# Patient Record
Sex: FEMALE | Race: WHITE | NOT HISPANIC OR LATINO | Employment: FULL TIME | URBAN - METROPOLITAN AREA
[De-identification: names, ages, dates, MRNs, and addresses within clinical notes are randomized per-mention and may not be internally consistent; named-entity substitution may affect disease eponyms.]

---

## 2018-06-07 ENCOUNTER — HOSPITAL ENCOUNTER (OUTPATIENT)
Dept: LAB | Facility: HOSPITAL | Age: 53
Discharge: HOME | End: 2018-06-07
Attending: FAMILY MEDICINE | Admitting: FAMILY MEDICINE

## 2018-06-07 LAB
ALBUMIN SERPL-MCNC: 3.7 G/DL (ref 3.5–4.8)
ALBUMIN/GLOB SERPL: 0.9 {RATIO} (ref 1–1.7)
ALP SERPL-CCNC: 115 IU/L (ref 32–91)
ALT SERPL-CCNC: 13 IU/L (ref 14–54)
ANION GAP SERPL CALC-SCNC: 11 MMOL/L (ref 10–20)
AST SERPL-CCNC: 16 IU/L (ref 15–41)
BASOPHILS # BLD AUTO: 0.1 10*3/UL (ref 0–0.2)
BASOPHILS NFR BLD AUTO: 1 % (ref 0–2)
BILIRUB SERPL-MCNC: 0.9 MG/DL (ref 0.3–1.2)
BUN SERPL-MCNC: 11 MG/DL (ref 8–20)
BUN/CREAT SERPL: 15.7 (ref 5.4–26.2)
CALCIUM SERPL-MCNC: 9.3 MG/DL (ref 8.9–10.3)
CHLORIDE SERPL-SCNC: 105 MMOL/L (ref 101–111)
CHOLEST SERPL-MCNC: 148 MG/DL
CHOLEST/HDLC SERPL: 2.7 {RATIO}
CONV CO2: 28 MMOL/L (ref 22–32)
CONV LDL CHOLESTEROL DIRECT: 83 MG/DL (ref 0–100)
CONV TOTAL PROTEIN: 7.6 G/DL (ref 6.1–7.9)
CREAT UR-MCNC: 0.7 MG/DL (ref 0.4–1)
DIFFERENTIAL METHOD BLD: (no result)
EOSINOPHIL # BLD AUTO: 0.1 10*3/UL (ref 0–0.3)
EOSINOPHIL # BLD AUTO: 2 % (ref 0–3)
ERYTHROCYTE [DISTWIDTH] IN BLOOD BY AUTOMATED COUNT: 14.2 % (ref 11.5–14.5)
GLOBULIN UR ELPH-MCNC: 3.9 G/DL (ref 2.5–3.8)
GLUCOSE SERPL-MCNC: 101 MG/DL (ref 65–99)
HCT VFR BLD AUTO: 41.5 % (ref 35–49)
HDLC SERPL-MCNC: 55 MG/DL
HGB BLD-MCNC: 13.9 G/DL (ref 12–15)
LDLC/HDLC SERPL: 1.5 {RATIO}
LIPID INTERPRETATION: NORMAL
LYMPHOCYTES # BLD AUTO: 1.2 10*3/UL (ref 0.8–4.8)
LYMPHOCYTES NFR BLD AUTO: 21 % (ref 18–42)
MCH RBC QN AUTO: 29.2 PG (ref 26–32)
MCHC RBC AUTO-ENTMCNC: 33.4 G/DL (ref 32–36)
MCV RBC AUTO: 87.4 FL (ref 80–94)
MONOCYTES # BLD AUTO: 0.4 10*3/UL (ref 0.1–1.3)
MONOCYTES NFR BLD AUTO: 6 % (ref 2–11)
NEUTROPHILS # BLD AUTO: 4.2 10*3/UL (ref 2.3–8.6)
NEUTROPHILS NFR BLD AUTO: 70 % (ref 50–75)
NRBC BLD AUTO-RTO: 0 /100{WBCS}
NRBC/RBC NFR BLD MANUAL: 0 10*3/UL
PLATELET # BLD AUTO: 259 10*3/UL (ref 150–450)
PMV BLD AUTO: 9.3 FL (ref 7.4–10.4)
POTASSIUM SERPL-SCNC: 5 MMOL/L (ref 3.6–5.1)
RBC # BLD AUTO: 4.75 10*6/UL (ref 4–5.4)
SODIUM SERPL-SCNC: 139 MMOL/L (ref 136–144)
TRIGL SERPL-MCNC: 44 MG/DL
VLDLC SERPL CALC-MCNC: 10.1 MG/DL
WBC # BLD AUTO: 5.9 10*3/UL (ref 4.5–11.5)

## 2024-02-07 ENCOUNTER — OFFICE VISIT (OUTPATIENT)
Dept: PODIATRY | Facility: CLINIC | Age: 59
End: 2024-02-07
Payer: COMMERCIAL

## 2024-02-07 VITALS — HEIGHT: 64 IN | WEIGHT: 256 LBS | BODY MASS INDEX: 43.71 KG/M2 | RESPIRATION RATE: 20 BRPM

## 2024-02-07 DIAGNOSIS — M21.622 TAILOR'S BUNION OF LEFT FOOT: ICD-10-CM

## 2024-02-07 DIAGNOSIS — M79.671 BILATERAL FOOT PAIN: Primary | ICD-10-CM

## 2024-02-07 DIAGNOSIS — M79.672 BILATERAL FOOT PAIN: Primary | ICD-10-CM

## 2024-02-07 DIAGNOSIS — M19.071 ARTHRITIS OF MIDTARSAL JOINTS OF BOTH FEET: ICD-10-CM

## 2024-02-07 DIAGNOSIS — I87.323 CHRONIC VENOUS HYPERTENSION (IDIOPATHIC) WITH INFLAMMATION OF BILATERAL LOWER EXTREMITY: ICD-10-CM

## 2024-02-07 DIAGNOSIS — Q66.221 METATARSUS ADDUCTUS OF BOTH FEET: ICD-10-CM

## 2024-02-07 DIAGNOSIS — M21.861 ACQUIRED POSTERIOR EQUINUS OF BOTH LOWER EXTREMITIES: ICD-10-CM

## 2024-02-07 DIAGNOSIS — Q66.222 METATARSUS ADDUCTUS OF BOTH FEET: ICD-10-CM

## 2024-02-07 DIAGNOSIS — M19.072 ARTHRITIS OF MIDTARSAL JOINTS OF BOTH FEET: ICD-10-CM

## 2024-02-07 DIAGNOSIS — M21.862 ACQUIRED POSTERIOR EQUINUS OF BOTH LOWER EXTREMITIES: ICD-10-CM

## 2024-02-07 DIAGNOSIS — E66.01 MORBID OBESITY WITH BMI OF 40.0-44.9, ADULT: ICD-10-CM

## 2024-02-07 RX ORDER — SENNOSIDES 8.6 MG
CAPSULE ORAL
COMMUNITY

## 2024-02-07 RX ORDER — MELOXICAM 15 MG/1
TABLET ORAL
COMMUNITY

## 2024-02-07 RX ORDER — FAMOTIDINE 40 MG/1
TABLET, FILM COATED ORAL
COMMUNITY
Start: 2024-01-29

## 2024-02-07 RX ORDER — MULTIVITAMIN WITH IRON
TABLET ORAL
COMMUNITY

## 2024-02-07 RX ORDER — FOLIC ACID 1 MG/1
TABLET ORAL
COMMUNITY

## 2024-02-07 RX ORDER — METHOTREXATE 2.5 MG/1
TABLET ORAL
COMMUNITY

## 2024-02-07 NOTE — PROGRESS NOTES
"02/07/2024  Foot and Ankle Surgery - New Patient   Provider: Dr. Tushar Rojas DPM  Location: [facility]    Subjective:  Joan Mills is a 58 y.o. female.     Chief Complaint   Patient presents with    Left Foot - Pain    Right Foot - Pain    Establish Care     BARBIE Alba MD 08/15/2023       HPI: The patient is a 58-year-old female who presents to the office today as a new patient for evaluation of bilateral foot pain.    The patient complains of a burning sensation and a sensation of a \"stake running through\" her knee after approximately 4 hours. Additionally, she reports experiencing spasms to the posterior aspect of her lower extremities. However, she adds that she is able to stand straight and touch the floor. The patient notes her symptoms worsened after oscar COVID-19. She verbalizes that her issues cause compensatory pain in her knees, thighs, and hips. She denies any history of surgeries involving her lower extremities or feet. However, the patient does note she fractured her talus bone in high school. She also recalls a fracture when she \"came off the truck.\" She states she has lost all movement, and she wears an arthritic boot. The patient notes she saw a specialist, Malick Waller, in [place] approximately 1.5 years ago who informed her no treatment options were available aside from \"welding bones.\" At that time, she was referred to physical therapy. She adds that she has tried making weight loss efforts, but it does not go very far. The patient states steroid injections have never been effective. She notes she does wear compression stockings. She adds that wearing inserts in her steel-toe work boots are not beneficial as they curl up under her arch which increases lateral loading.     The patient reports a history of rheumatoid and osteoarthritis. 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 She denies any other medical conditions such as " diabetes mellitus, cardiovascular disease, or renal issues.     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 She would like to retire in approximately 2 years.     She is on Orencia for rheumatoid arthritis, which is effective.     Allergies   Allergen Reactions    Cephalexin Unknown - High Severity    Penicillins Unknown - High Severity       Past Medical History:   Diagnosis Date    Callus     Difficulty walking     Hammer toe     Plantar fasciitis     Stress fracture        History reviewed. No pertinent surgical history.    Family History   Problem Relation Age of Onset    Cancer Maternal Grandfather        Social History     Socioeconomic History    Marital status:    Tobacco Use    Smoking status: Never    Smokeless tobacco: Never    Tobacco comments:     Never smoked   Vaping Use    Vaping Use: Never used   Substance and Sexual Activity    Alcohol use: Yes     Comment: 2 Glasses of wine  a week    Drug use: Never    Sexual activity: Never        Current Outpatient Medications on File Prior to Visit   Medication Sig Dispense Refill    acetaminophen (Tylenol 8 Hour Arthritis Pain) 650 MG 8 hr tablet       famotidine (PEPCID) 40 MG tablet       folic acid (FOLVITE) 1 MG tablet       Magnesium 250 MG tablet       meloxicam (MOBIC) 15 MG tablet       methotrexate 2.5 MG tablet       Potassium 99 MG tablet       vitamin D3 ([facility] D3) 125 MCG (5000 UT) capsule capsule        No current facility-administered medications on file prior to visit.       Review of Systems:  General: Denies fever, chills, fatigue, and weakness.  Eyes: Denies vision loss, blurry vision, and excessive redness.  ENT: Denies hearing issues and difficulty swallowing.  Cardiovascular: Denies palpitations, chest pain, or syncopal episodes.  Respiratory: Denies shortness of breath, wheezing, and coughing.  GI: Denies abdominal pain, nausea, and vomiting.   : Denies frequency,  "hematuria, and urgency.  Musculoskeletal: Denies muscle cramps, joint pains, and stiffness. Bilateral foot pain.  Derm: Denies rash, open wounds, or suspicious lesions.  Neuro: Denies headaches, numbness, loss of coordination, and tremors.  Psych: Denies anxiety and depression.  Endocrine: Denies temperature intolerance and changes in appetite.  Heme: Denies bleeding disorders or abnormal bruising.     Objective   Resp 20   Ht 162.6 cm (64\")   Wt 116 kg (256 lb)   BMI 43.94 kg/m²     Foot/Ankle Exam    GENERAL  Appearance:  obese  Orientation:  AAOx3  Affect:  appropriate    VASCULAR     Right Foot Vascularity   Normal vascular exam    Dorsalis pedis:  2+  Posterior tibial:  2+  Skin temperature:  warm  Edema grading:  None  CFT:  < 3 seconds  Pedal hair growth:  Present  Varicosities:  none     Left Foot Vascularity   Normal vascular exam    Dorsalis pedis:  2+  Posterior tibial:  2+  Skin temperature:  warm  Edema grading:  None  CFT:  < 3 seconds  Pedal hair growth:  Present  Varicosities:  none     NEUROLOGIC     Right Foot Neurologic   Light touch sensation: normal  Hot/Cold sensation: normal  Achilles reflex:  2+     Left Foot Neurologic   Light touch sensation: normal  Hot/Cold sensation:  normal  Achilles reflex:  2+    MUSCULOSKELETAL     Right Foot Musculoskeletal   Arch:  Normal     Left Foot Musculoskeletal   Arch:  Normal  Tailor's bunion: Yes      MUSCLE STRENGTH     Right Foot Muscle Strength   Normal strength    Foot dorsiflexion:  5  Foot plantar flexion:  5  Foot inversion:  5  Foot eversion:  5     Left Foot Muscle Strength   Normal strength    Foot dorsiflexion:  5  Foot plantar flexion:  5  Foot inversion:  5  Foot eversion:  5    DERMATOLOGIC      Right Foot Dermatologic   Skin  Right foot skin is intact.   Nails comment:  Nails 1-5     Left Foot Dermatologic   Skin  Left foot skin is intact.   Nails comment:  Nails 1-5    TESTS     Right Foot Tests   Anterior drawer: negative  Varus tilt: " negative     Left Foot Tests   Anterior drawer: negative  Varus tilt: negative     Right foot additional comments: Moderate pitting edema involving the bilateral lower extremities. Significant metatarsus adductus, left greater than right. Moderate equinus contracture with knee extended and flexed to bilateral lower extremities. Prominent soft tissue and bony rigidity involving the feet. No open wounds or signs of infection.     Left foot additional comments: Moderate pitting edema involving the bilateral lower extremities. Significant metatarsus adductus, left greater than right. Lateral column pain on the left foot with prominent tailor's bunion deformity. Moderate equinus contracture with knee extended and flexed to bilateral lower extremities. Prominent soft tissue and bony rigidity involving the feet. Moderate callus formation involving the plantar lateral aspect of the left foot. No open wounds or signs of infection.      Assessment & Plan   Diagnoses and all orders for this visit:    1. Bilateral foot pain (Primary)  -     XR Foot 3+ View Bilateral    2. Metatarsus adductus of both feet    3. Tailor's bunion of left foot  -     Case Request; Standing  -     CBC (No Diff); Future  -     Basic Metabolic Panel; Future  -     XR Chest 2 View; Future  -     ECG 12 Lead; Future  -     clindamycin (CLEOCIN) 900 mg in dextrose (D5W) 5 % 100 mL IVPB  -     Case Request    4. Arthritis of midtarsal joints of both feet    5. Acquired posterior equinus of both lower extremities  -     Case Request; Standing  -     CBC (No Diff); Future  -     Basic Metabolic Panel; Future  -     XR Chest 2 View; Future  -     ECG 12 Lead; Future  -     clindamycin (CLEOCIN) 900 mg in dextrose (D5W) 5 % 100 mL IVPB  -     Case Request    6. Morbid obesity with BMI of 40.0-44.9, adult    7. Chronic venous hypertension (idiopathic) with inflammation of bilateral lower extremity    Other orders  -     Follow Anesthesia Guidelines / Protocol;  Future  -     Follow Anesthesia Guidelines / Protocol; Standing  -     Verify / Perform Chlorhexidine Skin Prep; Standing  -     Verify / Perform Chlorhexidine Skin Prep if Indicated (If Not Already Completed); Standing  -     Obtain Informed Consent; Future  -     Provide NPO Instructions to Patient; Future  -     Chlorhexidine Skin Prep; Future      The patient presents to the office today as a new patient for evaluation of bilateral foot pain. X-rays of the bilateral feet, obtained today 02/07/2024, were independently reviewed revealing a significant amount of arthritis involving the midfoot, right greater than left. Imaging, diagnosis, and treatment options were discussed at length with the patient. Discussed the effects of gravity, weight, and activity, and advised her symptoms are related to an excessive amount of lateral loading. Advised there is not a significant amount of conservative options other than what has already been discussed and activity modification. Recommended wearing compression stockings and accomodative inserts in her shoes. Advised insurance will likely not pay for the custom inserts. Additionally, discussed surgical intervention options, one of which, would be an extensive and extended process. However, given the rigidity to the posterior aspect of her lower extremities, she may benefit from a gastrocnemius recession and fifth metatarsal osteotomy to manage the tailor's bunion deformity.  Surgical procedure, risks, goals, and recovery were discussed in detail with the patient. Recommended beginning with the left lower extremity as it is more problematic. Advised these options are to try to allow for increased comfort and management. Recommended taking 2 to 3 months off of work with short-term disability.  She does understand that she remains at risk of continued pain and limitation despite surgical efforts due to the difficult nature of her feet.  Will have the surgery scheduler contact  her to schedule the surgery as she chooses. Greater than 45 minutes was spent before, during, and after evaluation for patient care.     Orders Placed This Encounter   Procedures    XR Foot 3+ View Bilateral     Weight Bearing     Order Specific Question:   Reason for Exam:     Answer:   burning in feet, pain in lateral aspcect of abdi feet rm 13 wb     Order Specific Question:   Release to patient     Answer:   Routine Release [[id number]]    XR Chest 2 View     Standing Status:   Future     Standing Expiration Date:   2/8/2025     Order Specific Question:   Reason for Exam:     Answer:   Preop     Order Specific Question:   Release to patient     Answer:   Routine Release [[id number]]    CBC (No Diff)     Standing Status:   Future     Standing Expiration Date:   2/8/2025     Order Specific Question:   Release to patient     Answer:   Routine Release [[id number]]    Basic Metabolic Panel     Standing Status:   Future     Standing Expiration Date:   2/8/2025     Order Specific Question:   Release to patient     Answer:   Routine Release [[id number]]    Obtain Informed Consent     Standing Status:   Future     Order Specific Question:   Informed Consent Given For     Answer:   Gastrocnemius recession with fifth metatarsal osteotomy to manage tailor's bunion deformity all to the left lower extremity    Provide NPO Instructions to Patient     Standing Status:   Future    Chlorhexidine Skin Prep     Chlorhexidine Skin Prep and Instructions For All Patients Having A Procedure Requiring an Outward Incision if Not Allergic. If Allergic, Give Antibacterial Skin Wipes and Instructions. Do Not Use For Facial Cases or on Any Mucus Membranes.     Standing Status:   Future    ECG 12 Lead     Standing Status:   Future     Standing Expiration Date:   2/8/2025     Order Specific Question:   Reason for Exam:     Answer:   Preop     Order Specific Question:   Release to patient     Answer:   Routine Release [[id number]]        Note  is dictated utilizing voice recognition software. Unfortunately this leads to occasional typographical errors. I apologize in advance if the situation occurs. If questions occur please do not hesitate to call our office.    Transcribed from ambient dictation for APRIL Rojas DPM by Kathrine Vinson.  02/07/24   12:28 EST    Patient or patient representative verbalized consent to the visit recording.  I have personally performed the services described in this document as transcribed by the above individual, and it is both accurate and complete.

## 2024-02-07 NOTE — H&P (VIEW-ONLY)
"02/07/2024  Foot and Ankle Surgery - New Patient   Provider: Dr. Tushar Rojas DPM  Location: [facility]    Subjective:  Joan Mills is a 58 y.o. female.     Chief Complaint   Patient presents with    Left Foot - Pain    Right Foot - Pain    Establish Care     BARBIE Alba MD 08/15/2023       HPI: The patient is a 58-year-old female who presents to the office today as a new patient for evaluation of bilateral foot pain.    The patient complains of a burning sensation and a sensation of a \"stake running through\" her knee after approximately 4 hours. Additionally, she reports experiencing spasms to the posterior aspect of her lower extremities. However, she adds that she is able to stand straight and touch the floor. The patient notes her symptoms worsened after oscar COVID-19. She verbalizes that her issues cause compensatory pain in her knees, thighs, and hips. She denies any history of surgeries involving her lower extremities or feet. However, the patient does note she fractured her talus bone in high school. She also recalls a fracture when she \"came off the truck.\" She states she has lost all movement, and she wears an arthritic boot. The patient notes she saw a specialist, Malick Waller, in [place] approximately 1.5 years ago who informed her no treatment options were available aside from \"welding bones.\" At that time, she was referred to physical therapy. She adds that she has tried making weight loss efforts, but it does not go very far. The patient states steroid injections have never been effective. She notes she does wear compression stockings. She adds that wearing inserts in her steel-toe work boots are not beneficial as they curl up under her arch which increases lateral loading.     The patient reports a history of rheumatoid and osteoarthritis. 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 She denies any other medical conditions such as " diabetes mellitus, cardiovascular disease, or renal issues.     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 She would like to retire in approximately 2 years.     She is on Orencia for rheumatoid arthritis, which is effective.     Allergies   Allergen Reactions    Cephalexin Unknown - High Severity    Penicillins Unknown - High Severity       Past Medical History:   Diagnosis Date    Callus     Difficulty walking     Hammer toe     Plantar fasciitis     Stress fracture        History reviewed. No pertinent surgical history.    Family History   Problem Relation Age of Onset    Cancer Maternal Grandfather        Social History     Socioeconomic History    Marital status:    Tobacco Use    Smoking status: Never    Smokeless tobacco: Never    Tobacco comments:     Never smoked   Vaping Use    Vaping Use: Never used   Substance and Sexual Activity    Alcohol use: Yes     Comment: 2 Glasses of wine  a week    Drug use: Never    Sexual activity: Never        Current Outpatient Medications on File Prior to Visit   Medication Sig Dispense Refill    acetaminophen (Tylenol 8 Hour Arthritis Pain) 650 MG 8 hr tablet       famotidine (PEPCID) 40 MG tablet       folic acid (FOLVITE) 1 MG tablet       Magnesium 250 MG tablet       meloxicam (MOBIC) 15 MG tablet       methotrexate 2.5 MG tablet       Potassium 99 MG tablet       vitamin D3 (CVS D3) 125 MCG (5000 UT) capsule capsule        No current facility-administered medications on file prior to visit.       Review of Systems:  General: Denies fever, chills, fatigue, and weakness.  Eyes: Denies vision loss, blurry vision, and excessive redness.  ENT: Denies hearing issues and difficulty swallowing.  Cardiovascular: Denies palpitations, chest pain, or syncopal episodes.  Respiratory: Denies shortness of breath, wheezing, and coughing.  GI: Denies abdominal pain, nausea, and vomiting.   : Denies frequency,  "hematuria, and urgency.  Musculoskeletal: Denies muscle cramps, joint pains, and stiffness. Bilateral foot pain.  Derm: Denies rash, open wounds, or suspicious lesions.  Neuro: Denies headaches, numbness, loss of coordination, and tremors.  Psych: Denies anxiety and depression.  Endocrine: Denies temperature intolerance and changes in appetite.  Heme: Denies bleeding disorders or abnormal bruising.     Objective   Resp 20   Ht 162.6 cm (64\")   Wt 116 kg (256 lb)   BMI 43.94 kg/m²     Foot/Ankle Exam    GENERAL  Appearance:  obese  Orientation:  AAOx3  Affect:  appropriate    VASCULAR     Right Foot Vascularity   Normal vascular exam    Dorsalis pedis:  2+  Posterior tibial:  2+  Skin temperature:  warm  Edema grading:  None  CFT:  < 3 seconds  Pedal hair growth:  Present  Varicosities:  none     Left Foot Vascularity   Normal vascular exam    Dorsalis pedis:  2+  Posterior tibial:  2+  Skin temperature:  warm  Edema grading:  None  CFT:  < 3 seconds  Pedal hair growth:  Present  Varicosities:  none     NEUROLOGIC     Right Foot Neurologic   Light touch sensation: normal  Hot/Cold sensation: normal  Achilles reflex:  2+     Left Foot Neurologic   Light touch sensation: normal  Hot/Cold sensation:  normal  Achilles reflex:  2+    MUSCULOSKELETAL     Right Foot Musculoskeletal   Arch:  Normal     Left Foot Musculoskeletal   Arch:  Normal  Tailor's bunion: Yes      MUSCLE STRENGTH     Right Foot Muscle Strength   Normal strength    Foot dorsiflexion:  5  Foot plantar flexion:  5  Foot inversion:  5  Foot eversion:  5     Left Foot Muscle Strength   Normal strength    Foot dorsiflexion:  5  Foot plantar flexion:  5  Foot inversion:  5  Foot eversion:  5    DERMATOLOGIC      Right Foot Dermatologic   Skin  Right foot skin is intact.   Nails comment:  Nails 1-5     Left Foot Dermatologic   Skin  Left foot skin is intact.   Nails comment:  Nails 1-5    TESTS     Right Foot Tests   Anterior drawer: negative  Varus tilt: " negative     Left Foot Tests   Anterior drawer: negative  Varus tilt: negative     Right foot additional comments: Moderate pitting edema involving the bilateral lower extremities. Significant metatarsus adductus, left greater than right. Moderate equinus contracture with knee extended and flexed to bilateral lower extremities. Prominent soft tissue and bony rigidity involving the feet. No open wounds or signs of infection.     Left foot additional comments: Moderate pitting edema involving the bilateral lower extremities. Significant metatarsus adductus, left greater than right. Lateral column pain on the left foot with prominent tailor's bunion deformity. Moderate equinus contracture with knee extended and flexed to bilateral lower extremities. Prominent soft tissue and bony rigidity involving the feet. Moderate callus formation involving the plantar lateral aspect of the left foot. No open wounds or signs of infection.      Assessment & Plan   Diagnoses and all orders for this visit:    1. Bilateral foot pain (Primary)  -     XR Foot 3+ View Bilateral    2. Metatarsus adductus of both feet    3. Tailor's bunion of left foot  -     Case Request; Standing  -     CBC (No Diff); Future  -     Basic Metabolic Panel; Future  -     XR Chest 2 View; Future  -     ECG 12 Lead; Future  -     clindamycin (CLEOCIN) 900 mg in dextrose (D5W) 5 % 100 mL IVPB  -     Case Request    4. Arthritis of midtarsal joints of both feet    5. Acquired posterior equinus of both lower extremities  -     Case Request; Standing  -     CBC (No Diff); Future  -     Basic Metabolic Panel; Future  -     XR Chest 2 View; Future  -     ECG 12 Lead; Future  -     clindamycin (CLEOCIN) 900 mg in dextrose (D5W) 5 % 100 mL IVPB  -     Case Request    6. Morbid obesity with BMI of 40.0-44.9, adult    7. Chronic venous hypertension (idiopathic) with inflammation of bilateral lower extremity    Other orders  -     Follow Anesthesia Guidelines / Protocol;  Future  -     Follow Anesthesia Guidelines / Protocol; Standing  -     Verify / Perform Chlorhexidine Skin Prep; Standing  -     Verify / Perform Chlorhexidine Skin Prep if Indicated (If Not Already Completed); Standing  -     Obtain Informed Consent; Future  -     Provide NPO Instructions to Patient; Future  -     Chlorhexidine Skin Prep; Future      The patient presents to the office today as a new patient for evaluation of bilateral foot pain. X-rays of the bilateral feet, obtained today 02/07/2024, were independently reviewed revealing a significant amount of arthritis involving the midfoot, right greater than left. Imaging, diagnosis, and treatment options were discussed at length with the patient. Discussed the effects of gravity, weight, and activity, and advised her symptoms are related to an excessive amount of lateral loading. Advised there is not a significant amount of conservative options other than what has already been discussed and activity modification. Recommended wearing compression stockings and accomodative inserts in her shoes. Advised insurance will likely not pay for the custom inserts. Additionally, discussed surgical intervention options, one of which, would be an extensive and extended process. However, given the rigidity to the posterior aspect of her lower extremities, she may benefit from a gastrocnemius recession and fifth metatarsal osteotomy to manage the tailor's bunion deformity.  Surgical procedure, risks, goals, and recovery were discussed in detail with the patient. Recommended beginning with the left lower extremity as it is more problematic. Advised these options are to try to allow for increased comfort and management. Recommended taking 2 to 3 months off of work with short-term disability.  She does understand that she remains at risk of continued pain and limitation despite surgical efforts due to the difficult nature of her feet.  Will have the surgery scheduler contact  her to schedule the surgery as she chooses. Greater than 45 minutes was spent before, during, and after evaluation for patient care.     Orders Placed This Encounter   Procedures    XR Foot 3+ View Bilateral     Weight Bearing     Order Specific Question:   Reason for Exam:     Answer:   burning in feet, pain in lateral aspcect of abdi feet rm 13 wb     Order Specific Question:   Release to patient     Answer:   Routine Release [[id number]]    XR Chest 2 View     Standing Status:   Future     Standing Expiration Date:   2/8/2025     Order Specific Question:   Reason for Exam:     Answer:   Preop     Order Specific Question:   Release to patient     Answer:   Routine Release [[id number]]    CBC (No Diff)     Standing Status:   Future     Standing Expiration Date:   2/8/2025     Order Specific Question:   Release to patient     Answer:   Routine Release [[id number]]    Basic Metabolic Panel     Standing Status:   Future     Standing Expiration Date:   2/8/2025     Order Specific Question:   Release to patient     Answer:   Routine Release [[id number]]    Obtain Informed Consent     Standing Status:   Future     Order Specific Question:   Informed Consent Given For     Answer:   Gastrocnemius recession with fifth metatarsal osteotomy to manage tailor's bunion deformity all to the left lower extremity    Provide NPO Instructions to Patient     Standing Status:   Future    Chlorhexidine Skin Prep     Chlorhexidine Skin Prep and Instructions For All Patients Having A Procedure Requiring an Outward Incision if Not Allergic. If Allergic, Give Antibacterial Skin Wipes and Instructions. Do Not Use For Facial Cases or on Any Mucus Membranes.     Standing Status:   Future    ECG 12 Lead     Standing Status:   Future     Standing Expiration Date:   2/8/2025     Order Specific Question:   Reason for Exam:     Answer:   Preop     Order Specific Question:   Release to patient     Answer:   Routine Release [[id number]]        Note  is dictated utilizing voice recognition software. Unfortunately this leads to occasional typographical errors. I apologize in advance if the situation occurs. If questions occur please do not hesitate to call our office.    Transcribed from ambient dictation for APRIL Rojas DPM by Kathrine Vinson.  02/07/24   12:28 EST    Patient or patient representative verbalized consent to the visit recording.  I have personally performed the services described in this document as transcribed by the above individual, and it is both accurate and complete.

## 2024-02-09 PROBLEM — M21.861 ACQUIRED POSTERIOR EQUINUS OF BOTH LOWER EXTREMITIES: Status: ACTIVE | Noted: 2024-02-07

## 2024-02-09 PROBLEM — M21.862 ACQUIRED POSTERIOR EQUINUS OF BOTH LOWER EXTREMITIES: Status: ACTIVE | Noted: 2024-02-07

## 2024-02-09 PROBLEM — M21.622 TAILOR'S BUNION OF LEFT FOOT: Status: ACTIVE | Noted: 2024-02-07

## 2024-02-12 ENCOUNTER — HOSPITAL ENCOUNTER (OUTPATIENT)
Dept: GENERAL RADIOLOGY | Facility: HOSPITAL | Age: 59
Discharge: HOME | End: 2024-02-12
Payer: COMMERCIAL

## 2024-02-12 ENCOUNTER — HOSPITAL ENCOUNTER (OUTPATIENT)
Dept: CARDIOLOGY | Facility: HOSPITAL | Age: 59
Discharge: HOME | End: 2024-02-12
Payer: COMMERCIAL

## 2024-02-12 ENCOUNTER — LAB (OUTPATIENT)
Dept: LAB | Facility: HOSPITAL | Age: 59
End: 2024-02-12
Payer: COMMERCIAL

## 2024-02-12 DIAGNOSIS — M21.622 TAILOR'S BUNION OF LEFT FOOT: ICD-10-CM

## 2024-02-12 DIAGNOSIS — M21.861 ACQUIRED POSTERIOR EQUINUS OF BOTH LOWER EXTREMITIES: ICD-10-CM

## 2024-02-12 DIAGNOSIS — M21.862 ACQUIRED POSTERIOR EQUINUS OF BOTH LOWER EXTREMITIES: ICD-10-CM

## 2024-02-12 LAB
ANION GAP SERPL CALCULATED.3IONS-SCNC: 10.5 MMOL/L (ref 5–15)
BUN SERPL-MCNC: 20 MG/DL (ref 8–23)
BUN/CREAT SERPL: 24.1 (ref 7–25)
CALCIUM SPEC-SCNC: 9.2 MG/DL (ref 8.6–10.5)
CHLORIDE SERPL-SCNC: 106 MMOL/L (ref 98–107)
CO2 SERPL-SCNC: 26.5 MMOL/L (ref 22–29)
CREAT SERPL-MCNC: 0.83 MG/DL (ref 0.57–1)
DEPRECATED RDW RBC AUTO: 43 FL (ref 37–54)
EGFRCR SERPLBLD CKD-EPI 2021: 80.8 ML/MIN/1.73
ERYTHROCYTE [DISTWIDTH] IN BLOOD BY AUTOMATED COUNT: 13 % (ref 12.3–15.4)
GLUCOSE SERPL-MCNC: 99 MG/DL (ref 65–99)
HCT VFR BLD AUTO: 38.7 % (ref 34–46.6)
HGB BLD-MCNC: 12.7 G/DL (ref 12–15.9)
MCH RBC QN AUTO: 30.5 PG (ref 26.6–33)
MCHC RBC AUTO-ENTMCNC: 32.8 G/DL (ref 31.5–35.7)
MCV RBC AUTO: 92.8 FL (ref 79–97)
PLATELET # BLD AUTO: 217 10*3/MM3 (ref 140–450)
PMV BLD AUTO: 10.8 FL (ref 6–12)
POTASSIUM SERPL-SCNC: 4.3 MMOL/L (ref 3.5–5.2)
QT INTERVAL: 407 MS
QTC INTERVAL: 426 MS
RBC # BLD AUTO: 4.17 10*6/MM3 (ref 3.77–5.28)
SODIUM SERPL-SCNC: 143 MMOL/L (ref 136–145)
WBC NRBC COR # BLD AUTO: 5.67 10*3/MM3 (ref 3.4–10.8)

## 2024-02-12 PROCEDURE — 85027 COMPLETE CBC AUTOMATED: CPT

## 2024-02-12 PROCEDURE — 71046 X-RAY EXAM CHEST 2 VIEWS: CPT

## 2024-02-12 PROCEDURE — 80048 BASIC METABOLIC PNL TOTAL CA: CPT

## 2024-02-12 PROCEDURE — 36415 COLL VENOUS BLD VENIPUNCTURE: CPT

## 2024-02-12 PROCEDURE — 93005 ELECTROCARDIOGRAM TRACING: CPT | Performed by: PODIATRIST

## 2024-02-12 PROCEDURE — 93010 ELECTROCARDIOGRAM REPORT: CPT | Performed by: INTERNAL MEDICINE

## 2024-02-14 RX ORDER — PNV NO.95/FERROUS FUM/FOLIC AC 28MG-0.8MG
325 TABLET ORAL
COMMUNITY

## 2024-02-26 ENCOUNTER — ANESTHESIA (OUTPATIENT)
Dept: PERIOP | Facility: HOSPITAL | Age: 59
End: 2024-02-26
Payer: COMMERCIAL

## 2024-02-26 ENCOUNTER — HOSPITAL ENCOUNTER (OUTPATIENT)
Facility: HOSPITAL | Age: 59
Setting detail: HOSPITAL OUTPATIENT SURGERY
Discharge: HOME | End: 2024-02-26
Attending: PODIATRIST | Admitting: PODIATRIST
Payer: COMMERCIAL

## 2024-02-26 ENCOUNTER — ANESTHESIA EVENT (OUTPATIENT)
Dept: PERIOP | Facility: HOSPITAL | Age: 59
End: 2024-02-26
Payer: COMMERCIAL

## 2024-02-26 VITALS
HEIGHT: 63 IN | WEIGHT: 258 LBS | BODY MASS INDEX: 45.71 KG/M2 | SYSTOLIC BLOOD PRESSURE: 126 MMHG | RESPIRATION RATE: 12 BRPM | TEMPERATURE: 98.2 F | HEART RATE: 69 BPM | OXYGEN SATURATION: 100 % | DIASTOLIC BLOOD PRESSURE: 80 MMHG

## 2024-02-26 DIAGNOSIS — M21.622 TAILOR'S BUNION OF LEFT FOOT: ICD-10-CM

## 2024-02-26 DIAGNOSIS — M21.861 ACQUIRED POSTERIOR EQUINUS OF BOTH LOWER EXTREMITIES: ICD-10-CM

## 2024-02-26 DIAGNOSIS — M21.862 ACQUIRED POSTERIOR EQUINUS OF BOTH LOWER EXTREMITIES: ICD-10-CM

## 2024-02-26 PROCEDURE — 25010000002 PROPOFOL 200 MG/20ML EMULSION: Performed by: ANESTHESIOLOGIST ASSISTANT

## 2024-02-26 PROCEDURE — 25010000002 MIDAZOLAM PER 1 MG: Performed by: ANESTHESIOLOGIST ASSISTANT

## 2024-02-26 PROCEDURE — 25010000002 FENTANYL CITRATE (PF) 100 MCG/2ML SOLUTION: Performed by: ANESTHESIOLOGIST ASSISTANT

## 2024-02-26 PROCEDURE — 25010000002 CEFAZOLIN 3 G RECONSTITUTED SOLUTION 1 EACH VIAL: Performed by: PODIATRIST

## 2024-02-26 PROCEDURE — 25810000003 LACTATED RINGERS PER 1000 ML: Performed by: ANESTHESIOLOGY

## 2024-02-26 PROCEDURE — 25010000002 ONDANSETRON PER 1 MG: Performed by: ANESTHESIOLOGIST ASSISTANT

## 2024-02-26 PROCEDURE — 25010000002 BUPIVACAINE 0.5 % SOLUTION: Performed by: PODIATRIST

## 2024-02-26 PROCEDURE — 28306 INCISION OF METATARSAL: CPT | Performed by: PODIATRIST

## 2024-02-26 PROCEDURE — 25010000002 SUGAMMADEX 200 MG/2ML SOLUTION: Performed by: ANESTHESIOLOGIST ASSISTANT

## 2024-02-26 PROCEDURE — 27687 REVISION OF CALF TENDON: CPT | Performed by: PODIATRIST

## 2024-02-26 RX ORDER — ONDANSETRON 2 MG/ML
4 INJECTION INTRAMUSCULAR; INTRAVENOUS ONCE AS NEEDED
Status: DISCONTINUED | OUTPATIENT
Start: 2024-02-26 | End: 2024-02-26 | Stop reason: HOSPADM

## 2024-02-26 RX ORDER — ROCURONIUM BROMIDE 10 MG/ML
INJECTION, SOLUTION INTRAVENOUS AS NEEDED
Status: DISCONTINUED | OUTPATIENT
Start: 2024-02-26 | End: 2024-02-26 | Stop reason: SURG

## 2024-02-26 RX ORDER — EPHEDRINE SULFATE 5 MG/ML
5 INJECTION INTRAVENOUS ONCE AS NEEDED
Status: DISCONTINUED | OUTPATIENT
Start: 2024-02-26 | End: 2024-02-26 | Stop reason: HOSPADM

## 2024-02-26 RX ORDER — ACETAMINOPHEN 325 MG/1
650 TABLET ORAL ONCE AS NEEDED
Status: DISCONTINUED | OUTPATIENT
Start: 2024-02-26 | End: 2024-02-26 | Stop reason: HOSPADM

## 2024-02-26 RX ORDER — CYCLOBENZAPRINE HCL 5 MG
10 TABLET ORAL NIGHTLY PRN
Qty: 20 TABLET | Refills: 0 | Status: SHIPPED | OUTPATIENT
Start: 2024-02-26

## 2024-02-26 RX ORDER — MIDAZOLAM HYDROCHLORIDE 1 MG/ML
2 INJECTION INTRAMUSCULAR; INTRAVENOUS
Status: DISCONTINUED | OUTPATIENT
Start: 2024-02-26 | End: 2024-02-26 | Stop reason: HOSPADM

## 2024-02-26 RX ORDER — OXYCODONE HYDROCHLORIDE 5 MG/1
5 TABLET ORAL ONCE AS NEEDED
Status: DISCONTINUED | OUTPATIENT
Start: 2024-02-26 | End: 2024-02-26 | Stop reason: HOSPADM

## 2024-02-26 RX ORDER — LABETALOL HYDROCHLORIDE 5 MG/ML
5 INJECTION, SOLUTION INTRAVENOUS
Status: DISCONTINUED | OUTPATIENT
Start: 2024-02-26 | End: 2024-02-26 | Stop reason: HOSPADM

## 2024-02-26 RX ORDER — PROMETHAZINE HYDROCHLORIDE 25 MG/1
25 TABLET ORAL ONCE AS NEEDED
Status: DISCONTINUED | OUTPATIENT
Start: 2024-02-26 | End: 2024-02-26 | Stop reason: HOSPADM

## 2024-02-26 RX ORDER — SODIUM CHLORIDE, SODIUM LACTATE, POTASSIUM CHLORIDE, CALCIUM CHLORIDE 600; 310; 30; 20 MG/100ML; MG/100ML; MG/100ML; MG/100ML
1000 INJECTION, SOLUTION INTRAVENOUS CONTINUOUS
Status: DISCONTINUED | OUTPATIENT
Start: 2024-02-26 | End: 2024-02-26 | Stop reason: HOSPADM

## 2024-02-26 RX ORDER — FENTANYL CITRATE 50 UG/ML
INJECTION, SOLUTION INTRAMUSCULAR; INTRAVENOUS AS NEEDED
Status: DISCONTINUED | OUTPATIENT
Start: 2024-02-26 | End: 2024-02-26 | Stop reason: SURG

## 2024-02-26 RX ORDER — ACETAMINOPHEN 650 MG/1
650 SUPPOSITORY RECTAL EVERY 4 HOURS PRN
Status: DISCONTINUED | OUTPATIENT
Start: 2024-02-26 | End: 2024-02-26 | Stop reason: HOSPADM

## 2024-02-26 RX ORDER — DIPHENHYDRAMINE HYDROCHLORIDE 50 MG/ML
12.5 INJECTION INTRAMUSCULAR; INTRAVENOUS ONCE AS NEEDED
Status: DISCONTINUED | OUTPATIENT
Start: 2024-02-26 | End: 2024-02-26 | Stop reason: HOSPADM

## 2024-02-26 RX ORDER — SODIUM CHLORIDE 0.9 % (FLUSH) 0.9 %
10 SYRINGE (ML) INJECTION AS NEEDED
Status: DISCONTINUED | OUTPATIENT
Start: 2024-02-26 | End: 2024-02-26 | Stop reason: HOSPADM

## 2024-02-26 RX ORDER — DIPHENHYDRAMINE HCL 25 MG
25 CAPSULE ORAL
Status: DISCONTINUED | OUTPATIENT
Start: 2024-02-26 | End: 2024-02-26 | Stop reason: HOSPADM

## 2024-02-26 RX ORDER — IPRATROPIUM BROMIDE AND ALBUTEROL SULFATE 2.5; .5 MG/3ML; MG/3ML
3 SOLUTION RESPIRATORY (INHALATION) ONCE AS NEEDED
Status: DISCONTINUED | OUTPATIENT
Start: 2024-02-26 | End: 2024-02-26 | Stop reason: HOSPADM

## 2024-02-26 RX ORDER — LORAZEPAM 1 MG/1
1 TABLET ORAL EVERY 6 HOURS PRN
Status: DISCONTINUED | OUTPATIENT
Start: 2024-02-26 | End: 2024-02-26 | Stop reason: HOSPADM

## 2024-02-26 RX ORDER — MEPERIDINE HYDROCHLORIDE 25 MG/ML
12.5 INJECTION INTRAMUSCULAR; INTRAVENOUS; SUBCUTANEOUS
Status: DISCONTINUED | OUTPATIENT
Start: 2024-02-26 | End: 2024-02-26 | Stop reason: HOSPADM

## 2024-02-26 RX ORDER — PROPOFOL 10 MG/ML
INJECTION, EMULSION INTRAVENOUS AS NEEDED
Status: DISCONTINUED | OUTPATIENT
Start: 2024-02-26 | End: 2024-02-26 | Stop reason: SURG

## 2024-02-26 RX ORDER — LIDOCAINE HYDROCHLORIDE 10 MG/ML
0.5 INJECTION, SOLUTION INFILTRATION; PERINEURAL ONCE AS NEEDED
Status: DISCONTINUED | OUTPATIENT
Start: 2024-02-26 | End: 2024-02-26 | Stop reason: HOSPADM

## 2024-02-26 RX ORDER — MIDAZOLAM HYDROCHLORIDE 1 MG/ML
INJECTION INTRAMUSCULAR; INTRAVENOUS AS NEEDED
Status: DISCONTINUED | OUTPATIENT
Start: 2024-02-26 | End: 2024-02-26 | Stop reason: SURG

## 2024-02-26 RX ORDER — OXYCODONE HYDROCHLORIDE 5 MG/1
10 TABLET ORAL EVERY 4 HOURS PRN
Status: DISCONTINUED | OUTPATIENT
Start: 2024-02-26 | End: 2024-02-26 | Stop reason: HOSPADM

## 2024-02-26 RX ORDER — NALOXONE HCL 0.4 MG/ML
0.4 VIAL (ML) INJECTION AS NEEDED
Status: DISCONTINUED | OUTPATIENT
Start: 2024-02-26 | End: 2024-02-26 | Stop reason: HOSPADM

## 2024-02-26 RX ORDER — HYDRALAZINE HYDROCHLORIDE 20 MG/ML
5 INJECTION INTRAMUSCULAR; INTRAVENOUS
Status: DISCONTINUED | OUTPATIENT
Start: 2024-02-26 | End: 2024-02-26 | Stop reason: HOSPADM

## 2024-02-26 RX ORDER — FLUMAZENIL 0.1 MG/ML
0.5 INJECTION INTRAVENOUS AS NEEDED
Status: DISCONTINUED | OUTPATIENT
Start: 2024-02-26 | End: 2024-02-26 | Stop reason: HOSPADM

## 2024-02-26 RX ORDER — FENTANYL CITRATE 50 UG/ML
50 INJECTION, SOLUTION INTRAMUSCULAR; INTRAVENOUS
Status: DISCONTINUED | OUTPATIENT
Start: 2024-02-26 | End: 2024-02-26 | Stop reason: HOSPADM

## 2024-02-26 RX ORDER — ONDANSETRON 2 MG/ML
INJECTION INTRAMUSCULAR; INTRAVENOUS AS NEEDED
Status: DISCONTINUED | OUTPATIENT
Start: 2024-02-26 | End: 2024-02-26 | Stop reason: SURG

## 2024-02-26 RX ORDER — FENTANYL CITRATE 50 UG/ML
100 INJECTION, SOLUTION INTRAMUSCULAR; INTRAVENOUS
Status: DISCONTINUED | OUTPATIENT
Start: 2024-02-26 | End: 2024-02-26 | Stop reason: HOSPADM

## 2024-02-26 RX ORDER — HYDROCODONE BITARTRATE AND ACETAMINOPHEN 7.5; 325 MG/1; MG/1
1 TABLET ORAL EVERY 6 HOURS PRN
Qty: 28 TABLET | Refills: 0 | Status: SHIPPED | OUTPATIENT
Start: 2024-02-26

## 2024-02-26 RX ORDER — BUPIVACAINE HYDROCHLORIDE 5 MG/ML
INJECTION, SOLUTION PERINEURAL AS NEEDED
Status: DISCONTINUED | OUTPATIENT
Start: 2024-02-26 | End: 2024-02-26 | Stop reason: HOSPADM

## 2024-02-26 RX ORDER — DIPHENHYDRAMINE HYDROCHLORIDE 50 MG/ML
12.5 INJECTION INTRAMUSCULAR; INTRAVENOUS
Status: DISCONTINUED | OUTPATIENT
Start: 2024-02-26 | End: 2024-02-26 | Stop reason: HOSPADM

## 2024-02-26 RX ORDER — PROMETHAZINE HYDROCHLORIDE 25 MG/1
25 SUPPOSITORY RECTAL ONCE AS NEEDED
Status: DISCONTINUED | OUTPATIENT
Start: 2024-02-26 | End: 2024-02-26 | Stop reason: HOSPADM

## 2024-02-26 RX ADMIN — SODIUM CHLORIDE 3 G: 900 INJECTION INTRAVENOUS at 12:04

## 2024-02-26 RX ADMIN — ONDANSETRON 4 MG: 2 INJECTION INTRAMUSCULAR; INTRAVENOUS at 12:43

## 2024-02-26 RX ADMIN — PROPOFOL 20 MG: 10 INJECTION, EMULSION INTRAVENOUS at 12:14

## 2024-02-26 RX ADMIN — SODIUM CHLORIDE, POTASSIUM CHLORIDE, SODIUM LACTATE AND CALCIUM CHLORIDE 1000 ML: 600; 310; 30; 20 INJECTION, SOLUTION INTRAVENOUS at 11:09

## 2024-02-26 RX ADMIN — FENTANYL CITRATE 50 MCG: 50 INJECTION, SOLUTION INTRAMUSCULAR; INTRAVENOUS at 12:09

## 2024-02-26 RX ADMIN — PROPOFOL 180 MG: 10 INJECTION, EMULSION INTRAVENOUS at 12:09

## 2024-02-26 RX ADMIN — MIDAZOLAM HYDROCHLORIDE 1 MG: 2 INJECTION, SOLUTION INTRAMUSCULAR; INTRAVENOUS at 12:17

## 2024-02-26 RX ADMIN — MIDAZOLAM HYDROCHLORIDE 1 MG: 2 INJECTION, SOLUTION INTRAMUSCULAR; INTRAVENOUS at 13:01

## 2024-02-26 RX ADMIN — SUGAMMADEX 200 MG: 100 INJECTION, SOLUTION INTRAVENOUS at 12:53

## 2024-02-26 RX ADMIN — FENTANYL CITRATE 50 MCG: 50 INJECTION, SOLUTION INTRAMUSCULAR; INTRAVENOUS at 12:30

## 2024-02-26 RX ADMIN — ROCURONIUM BROMIDE 50 MG: 10 INJECTION, SOLUTION INTRAVENOUS at 12:09

## 2024-02-26 NOTE — ANESTHESIA POSTPROCEDURE EVALUATION
Patient: Joan Mills    Procedure Summary       Date: 02/26/24 Room / Location: [facility] OR 11 / [facility] OR    Anesthesia Start: 1202 Anesthesia Stop: 1303    Procedures:       RECESSION GASTROCNEMIUS (30 MINUTES) (Left)      METATARSAL OSTEOTOMY (Left: Foot) Diagnosis:       Tailor's bunion of left foot      Acquired posterior equinus of both lower extremities      (Tailor's bunion of left foot [M21.622])      (Acquired posterior equinus of both lower extremities [M21.861, M21.862])    Surgeons: APRIL Rojas DPM Provider: Antwon Elias MD    Anesthesia Type: general ASA Status: 3            Anesthesia Type: general    Vitals  Vitals Value Taken Time   /60 02/26/24 1357   Temp 98.6 °F (37 °C) 02/26/24 1357   Pulse 64 02/26/24 1359   Resp 11 02/26/24 1357   SpO2 100 % 02/26/24 1359   Vitals shown include unfiled device data.        Post Anesthesia Care and Evaluation    Patient location during evaluation: PACU  Patient participation: complete - patient participated  Level of consciousness: awake  Pain scale: See nurse's notes for pain score.  Pain management: adequate    Airway patency: patent  Anesthetic complications: No anesthetic complications  PONV Status: none  Cardiovascular status: acceptable  Respiratory status: acceptable and spontaneous ventilation  Hydration status: acceptable    Comments: Patient seen and examined postoperatively; vital signs stable; SpO2 greater than or equal to 90%; cardiopulmonary status stable; nausea/vomiting adequately controlled; pain adequately controlled; no apparent anesthesia complications; patient discharged from anesthesia care when discharge criteria were met

## 2024-02-26 NOTE — OP NOTE
Operative Note   Foot and Ankle Surgery   Provider: Dr. Tushar Rojas   Location: [facility]      Procedure:  1.  Gastrocnemius recession, left lower extremity  2.  Metatarsal osteotomy for tailor's bunion correction, left foot    Pre-operative Diagnosis:   1.  Chronic left foot pain  2.  Acquired equinus contracture, left lower extremity  3.  Tailor's bunion deformity, left foot  4.  Arthritis, left foot  5.  Morbid obesity    Post-operative Diagnosis: Same    Surgeon: Tushar Rojas    Assistant: Geovany Eubanks, PGY 2    Anesthesia: General    Implants: None    Findings: No unexpected findings    Specimen: None    Blood Loss: Less than 5cc    Complications: None    Post Op Plan: Discharge home.  Partial weightbearing activities as needed in cam boot.  Follow-up with me in 2 weeks    Summary:    Patient is a 58-year-old female that has been seen in office for chronic issues involving her left foot.  She complains of pain and limitation.  After evaluation, she does have significant degenerative changes involving the left foot along with metatarsus adductus.  She also has prominent equinus contracture and tailor's bunion deformity which is causing the majority of her symptoms.  We did discuss options at length.  Patient does not want to consider massive reconstructive surgery but would like to discuss options to help offload the pain involving the lateral column of the foot.  I did suggest consideration for gastrocnemius recession along with tailor's bunionectomy.  We reviewed the procedure and risks.  She understands and would like to proceed.    Procedure, risks, complications, and goals were discussed with the patient at bedside.  Risks include but are not limited to infection, complications from anesthesia (including death), chronic pain or numbness, hematoma/seroma, deep vein thrombosis, wound complications, and potential for additional surgical procedures.  Patient understands and elects to proceed with  surgery at this time. Informed consent was obtained before proceeding to the operating suite.  All questions were answered to the patient's satisfaction. No guarantees or assurances were given or implied.    Procedure:     Patient was brought to the operating room and placed under general anesthesia on the [facility].  Once adequate general anesthesia was obtained, a pneumatic tourniquet was placed about the patient's operative limb. Patient was then transitioned to the operating room table in a prone position ensuring to offload all bony prominences and appendages.  The operative limb was scrubbed prepped and draped in usual sterile fashion.  A formal timeout was conducted prior skin incision.  The operative limb was elevated and extenuated and the pneumatic tourniquet was inflated to 300 mmHg.     Attention was directed to the exterior calf region at the myotendinous junction. A linear incision was performed. Blunt dissection was performed to the deep fascia. The lesser saphenous vein and sural nerve were retracted. A stab incision to the deep fascia was performed. The gastrocnemius aponeurosis was identified and transected from a medial to lateral orientation without complication.  Care was taken to preserve the posterior deep fascia and sural nerve during the transection. A significant release to the equinus contracture was noted on the table. The incision was irrigated with normal saline and closed with a 2-0 vicryl to the deep fascia, 3-0 vicryl to the subcutaneous tissues and skin staples to the skin.      Attention was then directed to the lateral aspect of the distal fifth metatarsal region.  The dorsal and plantar aspects of the metatarsal at the level of the surgical neck were identified.  A small stab incision was made to the midline of the metatarsal at this level.  Blunt dissection was performed to bone.  Soft tissue and periosteal elevation was performed dorsally and plantarly giving adequate  visualization to the bone.  A sagittal saw was then used to perform a slightly oblique fracture from dorsal proximal to plantar distal.  Full-thickness osteotomy was performed and the capital fragment was noted to drift into a more reduced dorsal and medial orientation allowing for tailor's bunion reduction.  The wound was irrigated with normal saline.  Subcutaneous tissues were closed with 4-0 Vicryl and the skin was repaired with 3-0 nylon.    The incision sites were dressed with Xeroform and sterile compressive dressings.  The tourniquet was released and a prompt hyperemic response was noted to all digits of the operative limb.  Patient tolerated the procedure and anesthesia well.  Patient was transferred from the operating room to the recovery room with vital signs stable and neurovascular status unchanged to the operative lower extremity.      Dr. Tushar Rojas, MIGUEL  [facility]  [phone]    Note is dictated utilizing voice recognition software. Unfortunately this leads to occasional typographical errors. I apologize in advance if the situation occurs. If questions occur please do not hesitate to call our office.

## 2024-02-26 NOTE — ANESTHESIA PREPROCEDURE EVALUATION
Anesthesia Evaluation     Patient summary reviewed and Nursing notes reviewed   NPO Solid Status: > 8 hours  NPO Liquid Status: > 8 hours           Airway   Mallampati: II  TM distance: >3 FB  Neck ROM: full  No difficulty expected  Dental - normal exam     Pulmonary    Cardiovascular         Neuro/Psych  GI/Hepatic/Renal/Endo    (+) morbid obesity    Musculoskeletal     Abdominal    Substance History      OB/GYN          Other   arthritis, autoimmune disease rheumatoid arthritis,                 Anesthesia Plan    ASA 3     general     intravenous induction     Anesthetic plan, risks, benefits, and alternatives have been provided, discussed and informed consent has been obtained with: patient.    Plan discussed with CRNA.    CODE STATUS:

## 2024-02-26 NOTE — ANESTHESIA PROCEDURE NOTES
Airway  Urgency: elective    Date/Time: 2/26/2024 12:11 PM  End Time:2/26/2024 12:11 PM  Airway not difficult    General Information and Staff    Patient location during procedure: OR  Anesthesiologist: Antwon Elias MD  CRNA/CAA: Nicole Dotson CAA    Indications and Patient Condition  Indications for airway management: airway protection    Preoxygenated: yes  Mask difficulty assessment: 1 - vent by mask    Final Airway Details  Final airway type: endotracheal airway      Successful airway: ETT  Cuffed: yes   Successful intubation technique: video laryngoscopy  Facilitating devices/methods: intubating stylet  Endotracheal tube insertion site: oral  Blade: Renteria  Blade size: 3  ETT size (mm): 7.0  Cormack-Lehane Classification: grade I - full view of glottis  Placement verified by: chest auscultation, capnometry and palpation of cuff   Measured from: lips  ETT/EBT  to lips (cm): 21  Number of attempts at approach: 1  Assessment: lips, teeth, and gum same as pre-op and atraumatic intubation

## 2024-03-11 ENCOUNTER — OFFICE VISIT (OUTPATIENT)
Dept: PODIATRY | Facility: CLINIC | Age: 59
End: 2024-03-11
Payer: COMMERCIAL

## 2024-03-11 VITALS
BODY MASS INDEX: 45.71 KG/M2 | RESPIRATION RATE: 20 BRPM | WEIGHT: 258 LBS | HEIGHT: 63 IN | HEART RATE: 74 BPM | OXYGEN SATURATION: 98 %

## 2024-03-11 DIAGNOSIS — Q66.221 METATARSUS ADDUCTUS OF BOTH FEET: Primary | ICD-10-CM

## 2024-03-11 DIAGNOSIS — M21.862 ACQUIRED POSTERIOR EQUINUS OF BOTH LOWER EXTREMITIES: ICD-10-CM

## 2024-03-11 DIAGNOSIS — M21.861 ACQUIRED POSTERIOR EQUINUS OF BOTH LOWER EXTREMITIES: ICD-10-CM

## 2024-03-11 DIAGNOSIS — M21.622 TAILOR'S BUNION OF LEFT FOOT: ICD-10-CM

## 2024-03-11 DIAGNOSIS — Q66.222 METATARSUS ADDUCTUS OF BOTH FEET: Primary | ICD-10-CM

## 2024-03-11 DIAGNOSIS — I87.323 CHRONIC VENOUS HYPERTENSION (IDIOPATHIC) WITH INFLAMMATION OF BILATERAL LOWER EXTREMITY: ICD-10-CM

## 2024-03-11 DIAGNOSIS — E66.01 MORBID OBESITY WITH BMI OF 40.0-44.9, ADULT: ICD-10-CM

## 2024-03-11 PROCEDURE — 99024 POSTOP FOLLOW-UP VISIT: CPT | Performed by: PODIATRIST

## 2024-03-11 RX ORDER — TRAMADOL HYDROCHLORIDE 50 MG/1
TABLET ORAL
COMMUNITY
Start: 2024-02-14

## 2024-03-11 NOTE — PROGRESS NOTES
"03/11/2024  Foot and Ankle Surgery - Established Patient/Follow-up  Provider: Dr. Tushar Rojas DPM  Location: [facility]    Subjective:  Joan Mills is a 58 y.o. female.     Chief Complaint   Patient presents with    Left Foot - Post-op     2/26/2024   Gastrocnemius recession, left lower extremity  2.  Metatarsal osteotomy for tailor's bunion correction, left foot      Post-op     SHAUN Alba md  8/15/2023       HPI: The patient presents today after gastrocnemius recession and metatarsal osteotomy.    She has been getting along well in the boot. Her right foot has been hurting more than her left foot until the last several months. She note she has utilized compression stockings in the past. She adds she has 26 more weeks of disability.     Allergies   Allergen Reactions    Cephalexin Unknown - High Severity    Penicillins Unknown - High Severity       Current Outpatient Medications on File Prior to Visit   Medication Sig Dispense Refill    cyclobenzaprine (FLEXERIL) 5 MG tablet Take 2 tablets by mouth At Night As Needed for Muscle Spasms. 20 tablet 0    famotidine (PEPCID) 40 MG tablet Take 1 tablet by mouth Daily.      ferrous sulfate 325 (65 Fe) MG tablet Take 1 tablet by mouth Daily With Breakfast.      folic acid (FOLVITE) 1 MG tablet Take 1 tablet by mouth Daily.      Magnesium 250 MG tablet Take 1 tablet by mouth Daily.      meloxicam (MOBIC) 15 MG tablet Take 1 tablet by mouth Daily.      methotrexate 2.5 MG tablet Take 6 tablets by mouth 1 (One) Time Per Week. Tuesdays      Potassium 99 MG tablet       traMADol (ULTRAM) 50 MG tablet       vitamin D3 ([facility] D3) 125 MCG (5000 UT) capsule capsule Take 1 capsule by mouth Daily.       No current facility-administered medications on file prior to visit.       Objective   Pulse 74   Resp 20   Ht 160 cm (63\")   Wt 117 kg (258 lb)   SpO2 98%   BMI 45.70 kg/m²     Foot/Ankle Exam    GENERAL  Appearance:  obese  Orientation:  AAOx3  Affect:  " appropriate    VASCULAR     Right Foot Vascularity   Normal vascular exam    Dorsalis pedis:  2+  Posterior tibial:  2+  Skin temperature:  warm  Edema grading:  None  CFT:  < 3 seconds  Pedal hair growth:  Present  Varicosities:  none     Left Foot Vascularity   Normal vascular exam    Dorsalis pedis:  2+  Posterior tibial:  2+  Skin temperature:  warm  Edema grading:  None  CFT:  < 3 seconds  Pedal hair growth:  Present  Varicosities:  none     NEUROLOGIC     Right Foot Neurologic   Light touch sensation: normal  Hot/Cold sensation: normal  Achilles reflex:  2+     Left Foot Neurologic   Light touch sensation: normal  Hot/Cold sensation:  normal  Achilles reflex:  2+    MUSCULOSKELETAL     Right Foot Musculoskeletal   Arch:  Normal     Left Foot Musculoskeletal   Arch:  Normal  Tailor's bunion: Yes      MUSCLE STRENGTH     Right Foot Muscle Strength   Normal strength    Foot dorsiflexion:  5  Foot plantar flexion:  5  Foot inversion:  5  Foot eversion:  5     Left Foot Muscle Strength   Normal strength    Foot dorsiflexion:  5  Foot plantar flexion:  5  Foot inversion:  5  Foot eversion:  5    DERMATOLOGIC      Right Foot Dermatologic   Skin  Right foot skin is intact.   Nails comment:  Nails 1-5     Left Foot Dermatologic   Skin  Left foot skin is intact.   Nails comment:  Nails 1-5    TESTS     Right Foot Tests   Anterior drawer: negative  Varus tilt: negative     Left Foot Tests   Anterior drawer: negative  Varus tilt: negative     Right foot additional comments: Moderate pitting edema involving the bilateral lower extremities. Significant metatarsus adductus, left greater than right. Moderate equinus contracture with knee extended and flexed to bilateral lower extremities. Prominent soft tissue and bony rigidity involving the feet. No open wounds or signs of infection.     Left foot additional comments: Moderate pitting edema involving the bilateral lower extremities. Significant metatarsus adductus, left  greater than right. Lateral column pain on the left foot with prominent tailor's bunion deformity. Moderate equinus contracture with knee extended and flexed to bilateral lower extremities. Prominent soft tissue and bony rigidity involving the feet. Moderate callus formation involving the plantar lateral aspect of the left foot. No open wounds or signs of infection.    03/11/2024  Incision sites are dry and stable with intact staples to the calf and sutures to the foot. No progressive deformity or instability. No further issues or concerns.      Assessment & Plan   Diagnoses and all orders for this visit:    1. Metatarsus adductus of both feet (Primary)    2. Tailor's bunion of left foot    3. Acquired posterior equinus of both lower extremities    4. Morbid obesity with BMI of 40.0-44.9, adult    5. Chronic venous hypertension (idiopathic) with inflammation of bilateral lower extremity        Patient is here for follow up of gastrocnemius recession and metatarsal osteotomy. Imaging was not done today. Incision site looks well. Staples and sutures were removed today. I advise she can shower and bathe. I recommend to start stretching and performing range of motion exercises. I advise to stay in the boot to help offload this area.  I advise to use closed toe compression socks.  I explained to the patient it take approximately 2 months to settle and heal in. I advise to rest, ice, compress, and elevation for symptoms. She can soak it in Epsom salt. The patient will follow up in 4 weeks and repeat x-rays for the left foot    No orders of the defined types were placed in this encounter.         Note is dictated utilizing voice recognition software. Unfortunately this leads to occasional typographical errors. I apologize in advance if the situation occurs. If questions occur please do not hesitate to call our office.    Transcribed from ambient dictation for APRIL Rojas DPM by Royce King.  03/11/24   11:50  EDT    Patient or patient representative verbalized consent to the visit recording.  I have personally performed the services described in this document as transcribed by the above individual, and it is both accurate and complete.

## 2024-04-11 ENCOUNTER — OFFICE VISIT (OUTPATIENT)
Dept: PODIATRY | Facility: CLINIC | Age: 59
End: 2024-04-11
Payer: COMMERCIAL

## 2024-04-11 VITALS
OXYGEN SATURATION: 99 % | HEIGHT: 63 IN | RESPIRATION RATE: 20 BRPM | HEART RATE: 80 BPM | WEIGHT: 258 LBS | BODY MASS INDEX: 45.71 KG/M2

## 2024-04-11 DIAGNOSIS — M19.072 ARTHRITIS OF MIDTARSAL JOINTS OF BOTH FEET: ICD-10-CM

## 2024-04-11 DIAGNOSIS — Q66.221 METATARSUS ADDUCTUS OF BOTH FEET: ICD-10-CM

## 2024-04-11 DIAGNOSIS — Q66.222 METATARSUS ADDUCTUS OF BOTH FEET: ICD-10-CM

## 2024-04-11 DIAGNOSIS — M21.861 ACQUIRED POSTERIOR EQUINUS OF BOTH LOWER EXTREMITIES: ICD-10-CM

## 2024-04-11 DIAGNOSIS — M21.622 TAILOR'S BUNION OF LEFT FOOT: Primary | ICD-10-CM

## 2024-04-11 DIAGNOSIS — M21.862 ACQUIRED POSTERIOR EQUINUS OF BOTH LOWER EXTREMITIES: ICD-10-CM

## 2024-04-11 DIAGNOSIS — M79.671 BILATERAL FOOT PAIN: ICD-10-CM

## 2024-04-11 DIAGNOSIS — M25.562 LEFT KNEE PAIN, UNSPECIFIED CHRONICITY: ICD-10-CM

## 2024-04-11 DIAGNOSIS — M79.672 BILATERAL FOOT PAIN: ICD-10-CM

## 2024-04-11 DIAGNOSIS — M19.071 ARTHRITIS OF MIDTARSAL JOINTS OF BOTH FEET: ICD-10-CM

## 2024-04-11 PROCEDURE — 99024 POSTOP FOLLOW-UP VISIT: CPT | Performed by: PODIATRIST

## 2024-04-11 NOTE — PROGRESS NOTES
04/11/2024  Foot and Ankle Surgery - Established Patient/Follow-up  Provider: Dr. Tushar Rojas DPM  Location: [facility]    Subjective:  Joan Mills is a 58 y.o. female.     Chief Complaint   Patient presents with    Left Foot - Post-op       2/26/2024   Gastrocnemius recession, left lower extremity  2.  Metatarsal osteotomy for tailor's bunion correction, left foot        Follow-up     SHAUN Alba pcp last visit 02/13/2024       History of Present Illness  The patient is a 59-year-old female who presents for evaluation of multiple medical concerns. She is accompanied by an adult female.    The patient is currently 6 to 7 weeks post-surgery and has been managing well with the use of a boot. However, she reports experiencing pain in her right foot, a condition she has encountered for several years. Despite being advised by three physicians to seek further evaluation, her condition has not fully healed. Her mobility has improved, [place] she has ceased walking her dog due to its speed. She has attempted to use shoe inserts, as her foot appears wider than normal, and finds them ineffective under her arch. She also suffers from knee issues, which led to her walking on the lateral aspect of her foot. During her high school years, she was advised to undergo a knee replacement, which she has not yet undergone. She has been diagnosed with osteopenia in her spine. Her condition deteriorated following her RA diagnosis, leading her to question the necessity of resuming Orencia. She attributes her RA to a loss of hand use, ankle issues, and the unsuccessful injections. Following a consultation with Dr. Alba for carpal tunnel syndrome, she was advised to consult an RA specialist.      Allergies   Allergen Reactions    Cephalexin Unknown - High Severity    Penicillins Unknown - High Severity       Current Outpatient Medications on File Prior to Visit  "  Medication Sig Dispense Refill    cyclobenzaprine (FLEXERIL) 5 MG tablet Take 2 tablets by mouth At Night As Needed for Muscle Spasms. 20 tablet 0    famotidine (PEPCID) 40 MG tablet Take 1 tablet by mouth Daily.      ferrous sulfate 325 (65 Fe) MG tablet Take 1 tablet by mouth Daily With Breakfast.      folic acid (FOLVITE) 1 MG tablet Take 1 tablet by mouth Daily.      Magnesium 250 MG tablet Take 1 tablet by mouth Daily.      meloxicam (MOBIC) 15 MG tablet Take 1 tablet by mouth Daily.      methotrexate 2.5 MG tablet Take 6 tablets by mouth 1 (One) Time Per Week. Tuesdays      Potassium 99 MG tablet       vitamin D3 (CVS D3) 125 MCG (5000 UT) capsule capsule Take 1 capsule by mouth Daily.      traMADol (ULTRAM) 50 MG tablet  (Patient not taking: Reported on 4/11/2024)       No current facility-administered medications on file prior to visit.       Objective   Pulse 80   Resp 20   Ht 160 cm (63\")   Wt 117 kg (258 lb)   SpO2 99%   BMI 45.70 kg/m²     Foot/Ankle Exam  Physical Exam  GENERAL  Appearance:  obese  Orientation:  AAOx3  Affect:  appropriate     VASCULAR      Right Foot Vascularity   Normal vascular exam    Dorsalis pedis:  2+  Posterior tibial:  2+  Skin temperature:  warm  Edema grading:  None  CFT:  < 3 seconds  Pedal hair growth:  Present  Varicosities:  none      Left Foot Vascularity   Normal vascular exam    Dorsalis pedis:  2+  Posterior tibial:  2+  Skin temperature:  warm  Edema grading:  None  CFT:  < 3 seconds  Pedal hair growth:  Present  Varicosities:  none     NEUROLOGIC      Right Foot Neurologic   Light touch sensation: normal  Hot/Cold sensation: normal  Achilles reflex:  2+      Left Foot Neurologic   Light touch sensation: normal  Hot/Cold sensation:  normal  Achilles reflex:  2+     MUSCULOSKELETAL      Right Foot Musculoskeletal   Arch:  Normal      Left Foot Musculoskeletal   Arch:  Normal  Tailor's bunion: Yes       MUSCLE STRENGTH      Right Foot Muscle Strength   Normal " strength    Foot dorsiflexion:  5  Foot plantar flexion:  5  Foot inversion:  5  Foot eversion:  5      Left Foot Muscle Strength   Normal strength    Foot dorsiflexion:  5  Foot plantar flexion:  5  Foot inversion:  5  Foot eversion:  5     DERMATOLOGIC       Right Foot Dermatologic   Skin  Right foot skin is intact.   Nails comment:  Nails 1-5      Left Foot Dermatologic   Skin  Left foot skin is intact.   Nails comment:  Nails 1-5     TESTS      Right Foot Tests   Anterior drawer: negative  Varus tilt: negative      Left Foot Tests   Anterior drawer: negative  Varus tilt: negative     Right foot additional comments: Moderate pitting edema involving the bilateral lower extremities. Significant metatarsus adductus, left greater than right. Moderate equinus contracture with knee extended and flexed to bilateral lower extremities. Prominent soft tissue and bony rigidity involving the feet. No open wounds or signs of infection.     Left foot additional comments: Moderate pitting edema involving the bilateral lower extremities. Significant metatarsus adductus, left greater than right. Lateral column pain on the left foot with prominent tailor's bunion deformity. Moderate equinus contracture with knee extended and flexed to bilateral lower extremities. Prominent soft tissue and bony rigidity involving the feet. Moderate callus formation involving the plantar lateral aspect of the left foot. No open wounds or signs of infection.     03/11/2024  Incision sites are dry and stable with intact staples to the calf and sutures to the foot. No progressive deformity or instability. No further issues or concerns.    4/11/2024: No discomfort with palpation to the operative site.  Incision site is well-healed.  No progressive deformity or instability involving the foot.      Results  Imaging  X-ray of the foot shows no signs of healing in the fifth metatarsal.    Assessment & Plan   Diagnoses and all orders for this visit:    1.  Tailor's bunion of left foot (Primary)  -     XR Foot 3+ View Left    2. Bilateral foot pain  -     XR Foot 3+ View Left    3. Acquired posterior equinus of both lower extremities  -     XR Foot 3+ View Left    4. 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 Metatarsus adductus of both feet  -     XR Foot 3+ View Left    6. Left knee pain, unspecified chronicity  -     Ambulatory Referral to Orthopedic Surgery      Assessment & Plan    The patient's right foot has not yet fully healed, however, it is anticipated that the healing process will consolidate and heal over time. The patient has been advised to abstain from recreational walking and to engage in recreational walking as required. A referral to Dr. Melendez, an orthopedic surgeon, has been made for further evaluation of her left knee. The patient has been informed that she can recommence Orencia treatment.    Follow-up  The patient is scheduled for a follow-up visit in 4 weeks, during which a repeat x-ray will be conducted.             Patient or patient representative verbalized consent for the use of Ambient Listening during the visit with  APRIL Rojas DPM for chart documentation. 4/11/2024  19:02 EDT    APRIL Rojas DPM

## 2024-05-09 ENCOUNTER — OFFICE VISIT (OUTPATIENT)
Dept: PODIATRY | Facility: CLINIC | Age: 59
End: 2024-05-09
Payer: COMMERCIAL

## 2024-05-09 VITALS — HEIGHT: 63 IN | WEIGHT: 258 LBS | BODY MASS INDEX: 45.71 KG/M2 | RESPIRATION RATE: 20 BRPM

## 2024-05-09 DIAGNOSIS — I87.323 CHRONIC VENOUS HYPERTENSION (IDIOPATHIC) WITH INFLAMMATION OF BILATERAL LOWER EXTREMITY: ICD-10-CM

## 2024-05-09 DIAGNOSIS — M21.622 TAILOR'S BUNION OF LEFT FOOT: ICD-10-CM

## 2024-05-09 DIAGNOSIS — M21.861 ACQUIRED POSTERIOR EQUINUS, RIGHT: ICD-10-CM

## 2024-05-09 DIAGNOSIS — M19.072 ARTHRITIS OF MIDTARSAL JOINTS OF BOTH FEET: ICD-10-CM

## 2024-05-09 DIAGNOSIS — E66.01 MORBID OBESITY WITH BMI OF 40.0-44.9, ADULT: ICD-10-CM

## 2024-05-09 DIAGNOSIS — M79.671 RIGHT FOOT PAIN: Primary | ICD-10-CM

## 2024-05-09 DIAGNOSIS — M19.071 ARTHRITIS OF MIDTARSAL JOINTS OF BOTH FEET: ICD-10-CM

## 2024-08-08 ENCOUNTER — OFFICE VISIT (OUTPATIENT)
Dept: PODIATRY | Facility: CLINIC | Age: 59
End: 2024-08-08
Payer: COMMERCIAL

## 2024-08-08 VITALS — BODY MASS INDEX: 45.71 KG/M2 | WEIGHT: 258 LBS | HEIGHT: 63 IN | RESPIRATION RATE: 20 BRPM

## 2024-08-08 DIAGNOSIS — M19.071 ARTHRITIS OF MIDTARSAL JOINTS OF BOTH FEET: ICD-10-CM

## 2024-08-08 DIAGNOSIS — M79.671 RIGHT FOOT PAIN: Primary | ICD-10-CM

## 2024-08-08 DIAGNOSIS — E66.01 MORBID OBESITY WITH BMI OF 40.0-44.9, ADULT: ICD-10-CM

## 2024-08-08 DIAGNOSIS — M19.072 ARTHRITIS OF MIDTARSAL JOINTS OF BOTH FEET: ICD-10-CM

## 2024-08-08 DIAGNOSIS — M21.861 ACQUIRED POSTERIOR EQUINUS, RIGHT: ICD-10-CM

## 2024-08-08 DIAGNOSIS — I87.323 CHRONIC VENOUS HYPERTENSION (IDIOPATHIC) WITH INFLAMMATION OF BILATERAL LOWER EXTREMITY: ICD-10-CM

## 2024-08-08 DIAGNOSIS — M21.622 TAILOR'S BUNION OF LEFT FOOT: ICD-10-CM

## 2024-08-09 NOTE — PROGRESS NOTES
08/08/2024  Foot and Ankle Surgery - Established Patient/Follow-up  Provider: Dr. Tushar Rojas DPM  Location: [facility]    Subjective:  Joan Mills is a 59 y.o. female.     Chief Complaint   Patient presents with    Left Foot - Post-op      2/26/2024   Gastrocnemius recession, left lower extremity  2.  Metatarsal osteotomy for tailor's bunion correction, left foot      Right Lower Leg - Follow-up, Pain    Follow-up     SHAUN COWAN MD  2/13/2024       History of Present Illness  The patient is a 59-year-old female who presents for evaluation of ankle pain.    Three months ago, she was evaluated for her condition. Since then, she has resumed her work and is able to carry out her daily activities. She reports an improvement in her condition, with no burning sensation. However, she mentions occasional joint discomfort. She is seeking advice on preventing ankle rolling in her shoes without the aid of a brace. She purchased a brace from [employer], which she uses only during work to prevent the ankle from rolling. She used to wear high-top boots, but had to discontinue them due to other complications. She has had to rebuild the brace from [facility] due to difficulty tightening it on her ankle, so she continues to use her old brace.      Allergies   Allergen Reactions    Cephalexin Unknown - High Severity    Penicillins Unknown - High Severity       Current Outpatient Medications on File Prior to Visit   Medication Sig Dispense Refill    famotidine (PEPCID) 40 MG tablet Take 1 tablet by mouth Daily.      ferrous sulfate 325 (65 Fe) MG tablet Take 1 tablet by mouth Daily With Breakfast.      folic acid (FOLVITE) 1 MG tablet Take 1 tablet by mouth Daily.      Magnesium 250 MG tablet Take 1 tablet by mouth Daily.      meloxicam (MOBIC) 15 MG tablet Take 1 tablet by mouth Daily.      methotrexate 2.5 MG tablet Take 6 tablets by mouth 1 (One) Time Per Week. Tuesdays      Potassium 99 MG tablet       traMADol  "(ULTRAM) 50 MG tablet       vitamin D3 ([facility]S D3) 125 MCG (5000 UT) capsule capsule Take 1 capsule by mouth Daily.       No current facility-administered medications on file prior to visit.       Objective   Resp 20   Ht 160 cm (63\")   Wt 117 kg (258 lb)   BMI 45.70 kg/m²     Foot/Ankle Exam  Physical Exam  GENERAL  Appearance:  obese  Orientation:  AAOx3  Affect:  appropriate     VASCULAR      Right Foot Vascularity   Normal vascular exam    Dorsalis pedis:  2+  Posterior tibial:  2+  Skin temperature:  warm  Edema grading:  None  CFT:  < 3 seconds  Pedal hair growth:  Present  Varicosities:  none      Left Foot Vascularity   Normal vascular exam    Dorsalis pedis:  2+  Posterior tibial:  2+  Skin temperature:  warm  Edema grading:  None  CFT:  < 3 seconds  Pedal hair growth:  Present  Varicosities:  none     NEUROLOGIC      Right Foot Neurologic   Light touch sensation: normal  Hot/Cold sensation: normal  Achilles reflex:  2+      Left Foot Neurologic   Light touch sensation: normal  Hot/Cold sensation:  normal  Achilles reflex:  2+     MUSCULOSKELETAL      Right Foot Musculoskeletal   Arch:  Normal      Left Foot Musculoskeletal   Arch:  Normal  Tailor's bunion: Yes       MUSCLE STRENGTH      Right Foot Muscle Strength   Normal strength    Foot dorsiflexion:  5  Foot plantar flexion:  5  Foot inversion:  5  Foot eversion:  5      Left Foot Muscle Strength   Normal strength    Foot dorsiflexion:  5  Foot plantar flexion:  5  Foot inversion:  5  Foot eversion:  5     DERMATOLOGIC       Right Foot Dermatologic   Skin  Right foot skin is intact.   Nails comment:  Nails 1-5      Left Foot Dermatologic   Skin  Left foot skin is intact.   Nails comment:  Nails 1-5     TESTS      Right Foot Tests   Anterior drawer: negative  Varus tilt: negative      Left Foot Tests   Anterior drawer: negative  Varus tilt: negative     Right foot additional comments: Moderate pitting edema involving the bilateral lower extremities. " Significant metatarsus adductus, left greater than right. Moderate equinus contracture with knee extended and flexed to bilateral lower extremities. Prominent soft tissue and bony rigidity involving the feet. No open wounds or signs of infection.     05/09/2024 Continued pain and deformity involving the right foot and ankle.     Left foot additional comments: Moderate pitting edema involving the bilateral lower extremities. Significant metatarsus adductus, left greater than right. Lateral column pain on the left foot with prominent tailor's bunion deformity. Moderate equinus contracture with knee extended and flexed to bilateral lower extremities. Prominent soft tissue and bony rigidity involving the feet. Moderate callus formation involving the plantar lateral aspect of the left foot. No open wounds or signs of infection.     03/11/2024  Incision sites are dry and stable with intact staples to the calf and sutures to the foot. No progressive deformity or instability. No further issues or concerns.     4/11/2024: No discomfort with palpation to the operative site.  Incision site is well-healed.  No progressive deformity or instability involving the foot.     05/09/2024 There is less discomfort when palpating the left foot. Mild callus formation is present. No open wound or signs of infection. Lower leg pain has improved.     8/8/24: No progressive deformity or instability.  No significant pain with palpation to the left foot.  Continued varus angulation to the rear foot to both lower extremities.  Mild swelling involving the anterior lateral aspect of the ankle.      Results      Assessment & Plan   Diagnoses and all orders for this visit:    1. Right foot pain (Primary)    2. Morbid obesity with BMI of 40.0-44.9, adult    3. Chronic venous hypertension (idiopathic) with inflammation of bilateral lower extremity    4. Acquired posterior equinus, right    5. 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 Tailor's  bunion of left foot      Assessment & Plan    She was advised to utilize a brace or shoe that crosses her ankle with her foot structure. Activity modification and supportive footwear were recommended. Surgical options were also discussed. Lace-up boots, a compression stocking, were suggested as the most suitable option. She was advised to wear tennis shoes outside of work.Reviewed proper basic stretching and manual therapy exercises along with appropriate shoes and activity.  Discussed proper use and/or avoidance of OTC anti-inflammatories.  Patient is to call with any additional issues or concerns.  Greater than 20 minutes was spent before, during, and after evaluation for patient care.    Follow-up  Follow-up will be scheduled as needed.             Patient or patient representative verbalized consent for the use of Ambient Listening during the visit with  APRIL Rojas DPM for chart documentation. 8/9/2024  07:17 EDT    APRIL Rojas DPM

## 2024-09-30 ENCOUNTER — TELEPHONE (OUTPATIENT)
Dept: BARIATRICS/WEIGHT MGMT | Facility: CLINIC | Age: 59
End: 2024-09-30
Payer: COMMERCIAL

## 2024-10-25 ENCOUNTER — OFFICE VISIT (OUTPATIENT)
Dept: BARIATRICS/WEIGHT MGMT | Facility: CLINIC | Age: 59
End: 2024-10-25
Payer: COMMERCIAL

## 2024-10-25 VITALS — BODY MASS INDEX: 43.23 KG/M2 | HEIGHT: 63 IN | WEIGHT: 244 LBS

## 2024-10-25 DIAGNOSIS — E66.01 OBESITY, CLASS III, BMI 40-49.9 (MORBID OBESITY): Primary | ICD-10-CM

## 2024-10-25 NOTE — PROGRESS NOTES
Nutrition Services    Patient Name: Joan Mills  YOB: 1965  MRN: [id number]  Date of Service: 10/25/24      ICD-10-CM ICD-9-CM   1. Obesity, Class III, BMI 40-49.9 (morbid obesity)  E66.01 278.01          NUTRITION ASSESSMENT - BARIATRIC SURGERY      Reason for Visit MNT weight loss, new patient      H&P      Past Medical History:   Diagnosis Date    Bunion     Callus     Difficulty walking     Hammer toe     Osteoarthritis     Plantar fasciitis     Rheumatoid arthritis     Stress fracture        Past Surgical History:   Procedure Laterality Date    BUNIONECTOMY  2/26/2024    FOOT SURGERY  2/26/2024        METATARSAL OSTEOTOMY Left 02/26/2024    Procedure: METATARSAL OSTEOTOMY;  Surgeon: APRIL Rojas DPM;  Location: [facility];  Service: Podiatry;  Laterality: Left;    RECESSION GASTROCNEMIUS Left 02/26/2024    Procedure: RECESSION GASTROCNEMIUS (30 MINUTES);  Surgeon: APRIL Rojas DPM;  Location: [facility] MA[facility] OR;  Service: Podiatry;  Laterality: Left;    TENOTOMY ACHILLES TENDON  2/26/024    Dr. Mcpherson    THROAT SURGERY      childhood        Previous Goals          Encounter Information        Visit Narrative     Patient reports she has been losing weight but needs direction on recommendations. Patient was also dx with RA and unsure on diet recommendations. Patient states she works second shift and will sometimes forget to eat when working. This causes an energy crash or light headedness. Patient also reports doctor said something about insulin resistance but states she was not diabetic or pre diabetic.     Recommended patient follow consistent carb diet. Provided recommendations for carbs at meals and snacks. Patient to aim for 3 meals each day.     Diet Recall:   Breakfast: smoothie (fruit, yogurt, milk, and protein powder)  Lunch: skips sometimes or apple/banana, PB  Dinner: salads (veggies, cheese, chicken/fish) and yogurt and granola  Snacks:   Beverages: diet tea,  "coffee    Exercise:     Supplements:    Self Monitoring:          Anthropometrics        Current Height, Weight Height: 160 cm (63\")  Weight: 111 kg (244 lb) (10/25/24 0928)            Wt Readings from Last 30 Encounters:   10/25/24 0928 111 kg (244 lb)   08/08/24 0758 117 kg (258 lb)   05/09/24 0853 117 kg (258 lb)   04/11/24 0933 117 kg (258 lb)   03/11/24 0906 117 kg (258 lb)   02/26/24 1112 117 kg (258 lb)   02/14/24 0952 117 kg (257 lb)   02/07/24 0915 116 kg (256 lb)      BMI kg/m2 Body mass index is 43.22 kg/m².       Nutrition Diagnosis         Nutrition Dx Statement Overweight/obesity RT multifactorial biochemical, behavioral and environmental contributors to disease AEB BMI 43.22 kg/m^2         Nutrition Intervention         Nutrition Intervention Nutrition education related to diet modification and physical activity        Monitor/Evaluation        New Goals Patient to aim for 30-45 grams of carb at each meal  Patient to eat consistent meals during the day and aim to not skip lunch       Total time spent with pt 30 minutes of which 30 minutes were spent on education.       Electronically signed by:  Jeri Biggs RD  10/25/24 10:00 EDT  "

## (undated) DEVICE — SLV SCD CALF HEMOFORCE DVT THERP REPROC MD

## (undated) DEVICE — DEV TRANSFR CONTN STRL

## (undated) DEVICE — SUT VIC 4/0 PS2 18IN J496H

## (undated) DEVICE — SUT VIC 3/0 SH 27IN UD VCP416H

## (undated) DEVICE — CVR HNDL LT SURG ACCSSRY BLU STRL

## (undated) DEVICE — SOL H20 STRL 1000ML

## (undated) DEVICE — BLD SCLPL RIBBACK C/SS SZ15

## (undated) DEVICE — SPNG GZ WOVN 4X4IN 12PLY 10/BX STRL

## (undated) DEVICE — NDL HYPO PRECISIONGLIDE/REG 18G 1IN PNK

## (undated) DEVICE — GOWN SURG IMPERV  XLG

## (undated) DEVICE — SOL IRRIG NACL 1000ML

## (undated) DEVICE — SPNG BULKEE SUPERFLUFF 6X6.75IN LF STRL PK/5

## (undated) DEVICE — Device

## (undated) DEVICE — BNDG ESMARK 4IN 12FT LF STRL BLU

## (undated) DEVICE — PK EXTREM 50

## (undated) DEVICE — SUT ANTIBAC VIC PLS UD BR COAT CT/2 SZ2/0 27IN SLVR VCP269H

## (undated) DEVICE — PENCL HND ROCKRSWTCH HOLSTR EZ CLEAN TP CRD 10FT

## (undated) DEVICE — UNDERGLV SURG BIOGEL INDICAT PI SZ8 BLU

## (undated) DEVICE — SUT ETHLN 3/0 FS1 30IN 669H

## (undated) DEVICE — DRP SURG U/DRP IMPERV 54X76IN STRL

## (undated) DEVICE — GLV SURG BIOGEL M LTX PF 7 1/2

## (undated) DEVICE — APPL CHLORAPREP HI/LITE TINTED 10.5ML ORNG

## (undated) DEVICE — STPLR SKIN SURG REG/WD DISP STRL

## (undated) DEVICE — KT SURG TURNOVER 050